# Patient Record
Sex: MALE | Race: WHITE | NOT HISPANIC OR LATINO | Employment: STUDENT | URBAN - METROPOLITAN AREA
[De-identification: names, ages, dates, MRNs, and addresses within clinical notes are randomized per-mention and may not be internally consistent; named-entity substitution may affect disease eponyms.]

---

## 2020-07-15 ENCOUNTER — OFFICE VISIT (OUTPATIENT)
Dept: URGENT CARE | Age: 20
End: 2020-07-15
Payer: COMMERCIAL

## 2020-07-15 VITALS
HEIGHT: 63 IN | HEART RATE: 68 BPM | WEIGHT: 166 LBS | BODY MASS INDEX: 29.41 KG/M2 | OXYGEN SATURATION: 98 % | TEMPERATURE: 97.7 F

## 2020-07-15 DIAGNOSIS — Z11.59 ENCOUNTER FOR SCREENING FOR OTHER VIRAL DISEASES: Primary | ICD-10-CM

## 2020-07-15 PROCEDURE — 99213 OFFICE O/P EST LOW 20 MIN: CPT | Performed by: NURSE PRACTITIONER

## 2020-07-15 PROCEDURE — U0003 INFECTIOUS AGENT DETECTION BY NUCLEIC ACID (DNA OR RNA); SEVERE ACUTE RESPIRATORY SYNDROME CORONAVIRUS 2 (SARS-COV-2) (CORONAVIRUS DISEASE [COVID-19]), AMPLIFIED PROBE TECHNIQUE, MAKING USE OF HIGH THROUGHPUT TECHNOLOGIES AS DESCRIBED BY CMS-2020-01-R: HCPCS | Performed by: NURSE PRACTITIONER

## 2020-07-15 RX ORDER — FLUVOXAMINE MALEATE 50 MG/1
TABLET, COATED ORAL
COMMUNITY
Start: 2020-07-08

## 2020-07-15 NOTE — PROGRESS NOTES
NAME: Oddis Lundborg is a 23 y o  male  : 2000    MRN: [id number]    Pulse 68   Temp 97 7 °F (36 5 °C) (Tympanic)   Ht 5' 3" (1 6 m)   Wt 75 3 kg (166 lb)   SpO2 98%   BMI 29 41 kg/m²     Assessment and Plan   Encounter for screening for other viral diseases [Z11 59]  1  Encounter for screening for other viral diseases  MISC COVID-19 TEST- Office Collection       Hayden Golden was seen today for generalized body aches  Diagnoses and all orders for this visit:    Encounter for screening for other viral diseases  -     MISC COVID-19 TEST- Office Collection        Patient Instructions   Patient Instructions   Patient aware not to return to work for at least 2 weeks  Aware that has COVID testing to take 7-10 days  Aware to download the [employer] mobile barbara and monitor results of his labs  Symptoms worsen call or go to the ER or see his family doctor      Proceed to ER if symptoms worsen  Chief Complaint     Chief Complaint   Patient presents with    Generalized Body Aches     Pt reports 4-5 day hx of muscle aches, headache, nasal/chest congestion, nausea and low grade temp (100 yesterday), normal today  Denies sore throat, SOB, loss of taste/smell, or exposure to positive Covid-19 patient  History of Present Illness     23year-old male here today with generalized body aches and 4-5 days of muscle aches and headache  He had some nasal and chest congestion and nausea with a low-grade temp yesterday however no temp today  He denies any soreness in his throat shortness of breath law states this smell or exposure to positive COVID-19 patient's  Patient recently went to [facility] and states he was in an abandoned building but did not specify as to why  Review of Systems   Review of Systems   Constitutional: Positive for chills, fatigue and fever  HENT: Positive for congestion  Negative for sore throat  Respiratory: Positive for cough  Cardiovascular: Negative  Gastrointestinal: Negative  Musculoskeletal:        Body aches   Neurological: Negative for headaches  Current Medications       Current Outpatient Medications:     fluvoxaMINE (LUVOX) 50 mg tablet, take 1 tablet by oral route  every day at bedtime, Disp: , Rfl:     Current Allergies     Allergies as of 07/15/2020    (No Known Allergies)              History reviewed  No pertinent past medical history  Past Surgical History:   Procedure Laterality Date    HERNIA REPAIR         History reviewed  No pertinent family history  Medications have been verified  The following portions of the patient's history were reviewed and updated as appropriate: allergies, current medications, past family history, past medical history, past social history, past surgical history and problem list     Objective   Pulse 68   Temp 97 7 °F (36 5 °C) (Tympanic)   Ht 5' 3" (1 6 m)   Wt 75 3 kg (166 lb)   SpO2 98%   BMI 29 41 kg/m²      Physical Exam     Physical Exam   Constitutional: He appears well-developed and well-nourished  No distress  HENT:   Nose: Nose normal    Cardiovascular: Normal rate and normal heart sounds  Pulmonary/Chest: Effort normal and breath sounds normal  No stridor  He has no decreased breath sounds  He has no wheezes     Curbside assessment    Verdia Sieving

## 2020-07-15 NOTE — PATIENT INSTRUCTIONS
Patient aware not to return to work for at least 2 weeks  Aware that has COVID testing to take 7-10 days  Aware to download the [facility] mobile barbara and monitor results of his labs  Symptoms worsen call or go to the ER or see his family doctor

## 2020-07-22 LAB — SARS-COV-2 RNA SPEC QL NAA+PROBE: NOT DETECTED

## 2021-05-10 ENCOUNTER — IMMUNIZATIONS (OUTPATIENT)
Dept: FAMILY MEDICINE CLINIC | Facility: HOSPITAL | Age: 21
End: 2021-05-10

## 2021-05-10 DIAGNOSIS — Z23 ENCOUNTER FOR IMMUNIZATION: Primary | ICD-10-CM

## 2021-05-10 PROCEDURE — 0001A SARS-COV-2 / COVID-19 MRNA VACCINE (PFIZER-BIONTECH) 30 MCG: CPT

## 2021-05-10 PROCEDURE — 91300 SARS-COV-2 / COVID-19 MRNA VACCINE (PFIZER-BIONTECH) 30 MCG: CPT

## 2021-06-02 ENCOUNTER — IMMUNIZATIONS (OUTPATIENT)
Dept: FAMILY MEDICINE CLINIC | Facility: HOSPITAL | Age: 21
End: 2021-06-02

## 2021-06-02 DIAGNOSIS — Z23 ENCOUNTER FOR IMMUNIZATION: Primary | ICD-10-CM

## 2021-06-02 PROCEDURE — 0002A SARS-COV-2 / COVID-19 MRNA VACCINE (PFIZER-BIONTECH) 30 MCG: CPT

## 2021-06-02 PROCEDURE — 91300 SARS-COV-2 / COVID-19 MRNA VACCINE (PFIZER-BIONTECH) 30 MCG: CPT

## 2024-07-13 ENCOUNTER — HOSPITAL ENCOUNTER (OUTPATIENT)
Dept: CT IMAGING | Facility: HOSPITAL | Age: 24
Discharge: HOME | End: 2024-07-13
Attending: OTOLARYNGOLOGY
Payer: COMMERCIAL

## 2024-07-13 DIAGNOSIS — J32.9 CHRONIC SINUSITIS, UNSPECIFIED LOCATION: ICD-10-CM

## 2024-07-13 PROCEDURE — 70486 CT MAXILLOFACIAL W/O DYE: CPT
